# Patient Record
Sex: MALE | Race: WHITE | Employment: OTHER | URBAN - METROPOLITAN AREA
[De-identification: names, ages, dates, MRNs, and addresses within clinical notes are randomized per-mention and may not be internally consistent; named-entity substitution may affect disease eponyms.]

---

## 2018-02-28 PROBLEM — F31.9 BIPOLAR DEPRESSION (HCC): Status: ACTIVE | Noted: 2018-02-28

## 2018-02-28 PROBLEM — E78.00 HYPERCHOLESTEROLEMIA: Chronic | Status: ACTIVE | Noted: 2018-02-28

## 2020-03-02 ENCOUNTER — HOSPITAL ENCOUNTER (OUTPATIENT)
Dept: GENERAL RADIOLOGY | Age: 45
Discharge: HOME | End: 2020-03-02

## 2020-03-02 DIAGNOSIS — Z00.00 ROUTINE GENERAL MEDICAL EXAMINATION AT A HEALTH CARE FACILITY: ICD-10-CM

## 2021-03-08 ENCOUNTER — HOSPITAL ENCOUNTER (OUTPATIENT)
Dept: GENERAL RADIOLOGY | Age: 46
Discharge: HOME | End: 2021-03-08

## 2021-03-08 DIAGNOSIS — R73.01 FASTING HYPERGLYCEMIA: ICD-10-CM

## 2021-03-08 DIAGNOSIS — Z00.00 ROUTINE GENERAL MEDICAL EXAMINATION AT A HEALTH CARE FACILITY: ICD-10-CM

## 2021-03-08 DIAGNOSIS — E78.00 HYPERCHOLESTEROLEMIA: Chronic | ICD-10-CM

## 2021-03-15 PROBLEM — H52.10 MYOPIA: Status: ACTIVE | Noted: 2019-06-17

## 2021-03-15 PROBLEM — H25.019 CORTICAL SENILE CATARACT: Status: ACTIVE | Noted: 2019-06-17

## 2022-03-19 PROBLEM — E78.00 HYPERCHOLESTEROLEMIA: Status: ACTIVE | Noted: 2018-02-28

## 2022-03-19 PROBLEM — H25.019 CORTICAL SENILE CATARACT: Status: ACTIVE | Noted: 2019-06-17

## 2022-03-20 PROBLEM — H52.10 MYOPIA: Status: ACTIVE | Noted: 2019-06-17

## 2023-07-27 ENCOUNTER — OFFICE VISIT (OUTPATIENT)
Dept: INTERNAL MEDICINE CLINIC | Facility: CLINIC | Age: 48
End: 2023-07-27
Payer: COMMERCIAL

## 2023-07-27 VITALS
WEIGHT: 216.4 LBS | SYSTOLIC BLOOD PRESSURE: 100 MMHG | OXYGEN SATURATION: 97 % | HEART RATE: 59 BPM | BODY MASS INDEX: 28.68 KG/M2 | HEIGHT: 73 IN | DIASTOLIC BLOOD PRESSURE: 70 MMHG

## 2023-07-27 DIAGNOSIS — G89.29 CHRONIC BILATERAL LOW BACK PAIN WITHOUT SCIATICA: ICD-10-CM

## 2023-07-27 DIAGNOSIS — L23.7 POISON IVY DERMATITIS: Primary | ICD-10-CM

## 2023-07-27 DIAGNOSIS — M54.50 CHRONIC BILATERAL LOW BACK PAIN WITHOUT SCIATICA: ICD-10-CM

## 2023-07-27 DIAGNOSIS — B07.9 VIRAL WARTS, UNSPECIFIED TYPE: ICD-10-CM

## 2023-07-27 DIAGNOSIS — M51.36 DEGENERATIVE DISC DISEASE, LUMBAR: ICD-10-CM

## 2023-07-27 DIAGNOSIS — M51.27 LUMBOSACRAL DISC HERNIATION: ICD-10-CM

## 2023-07-27 PROCEDURE — 99214 OFFICE O/P EST MOD 30 MIN: CPT | Performed by: NURSE PRACTITIONER

## 2023-07-27 RX ORDER — GABAPENTIN 100 MG/1
100 CAPSULE ORAL NIGHTLY
COMMUNITY

## 2023-07-27 RX ORDER — PREDNISONE 20 MG/1
TABLET ORAL
Qty: 18 TABLET | Refills: 0 | Status: SHIPPED | OUTPATIENT
Start: 2023-07-27

## 2023-07-27 SDOH — ECONOMIC STABILITY: FOOD INSECURITY: WITHIN THE PAST 12 MONTHS, YOU WORRIED THAT YOUR FOOD WOULD RUN OUT BEFORE YOU GOT MONEY TO BUY MORE.: NEVER TRUE

## 2023-07-27 SDOH — ECONOMIC STABILITY: INCOME INSECURITY: HOW HARD IS IT FOR YOU TO PAY FOR THE VERY BASICS LIKE FOOD, HOUSING, MEDICAL CARE, AND HEATING?: NOT HARD AT ALL

## 2023-07-27 SDOH — ECONOMIC STABILITY: FOOD INSECURITY: WITHIN THE PAST 12 MONTHS, THE FOOD YOU BOUGHT JUST DIDN'T LAST AND YOU DIDN'T HAVE MONEY TO GET MORE.: NEVER TRUE

## 2023-07-27 SDOH — ECONOMIC STABILITY: HOUSING INSECURITY
IN THE LAST 12 MONTHS, WAS THERE A TIME WHEN YOU DID NOT HAVE A STEADY PLACE TO SLEEP OR SLEPT IN A SHELTER (INCLUDING NOW)?: NO

## 2023-07-27 ASSESSMENT — PATIENT HEALTH QUESTIONNAIRE - PHQ9
SUM OF ALL RESPONSES TO PHQ QUESTIONS 1-9: 0
SUM OF ALL RESPONSES TO PHQ9 QUESTIONS 1 & 2: 0
1. LITTLE INTEREST OR PLEASURE IN DOING THINGS: 0
2. FEELING DOWN, DEPRESSED OR HOPELESS: 0
SUM OF ALL RESPONSES TO PHQ QUESTIONS 1-9: 0

## 2023-07-27 NOTE — PROGRESS NOTES
refer to Dr. Efren Rodrigues for evaluation and treatment. Regarding his low back pain he would like to have referral however we discussed he will need repeat MRI. He agrees. Also discussed typically his back pain may feel better while he is using the prednisone. Avoid NSAIDs while on prednisone. DAVID [place] - GAGANDE[facility]    Dictated using voice recognition software.  Proofread, but unrecognized voice recognition errors may exist.

## 2023-08-06 PROBLEM — M51.36 DEGENERATIVE DISC DISEASE, LUMBAR: Status: ACTIVE | Noted: 2023-08-06

## 2023-08-06 ASSESSMENT — ENCOUNTER SYMPTOMS: BACK PAIN: 1

## 2023-08-14 ENCOUNTER — TELEPHONE (OUTPATIENT)
Dept: INTERNAL MEDICINE CLINIC | Facility: CLINIC | Age: 48
End: 2023-08-14

## 2023-08-14 DIAGNOSIS — B07.9 VIRAL WARTS, UNSPECIFIED TYPE: Primary | ICD-10-CM

## 2023-08-14 NOTE — TELEPHONE ENCOUNTER
----- Message from Jodene Cumber III sent at 8/11/2023 11:21 AM EDT -----  Regarding: Dr. Tom Wright referral problem  Contact: [phone]  Hi! Dr. Elvia Sanchez office DOES NOT do in house wart removal.  They only do 'treatment' and said that would be a prescription only. I declined the appointment. May I please request a referral to a dermatologist that definitely does do 'in house' removals?

## 2023-10-24 ENCOUNTER — HOSPITAL ENCOUNTER (OUTPATIENT)
Dept: MRI IMAGING | Age: 48
Discharge: HOME | End: 2023-10-27
Payer: COMMERCIAL

## 2023-10-24 DIAGNOSIS — M54.50 CHRONIC BILATERAL LOW BACK PAIN WITHOUT SCIATICA: ICD-10-CM

## 2023-10-24 DIAGNOSIS — M51.27 LUMBOSACRAL DISC HERNIATION: ICD-10-CM

## 2023-10-24 DIAGNOSIS — G89.29 CHRONIC BILATERAL LOW BACK PAIN WITHOUT SCIATICA: ICD-10-CM

## 2023-10-24 DIAGNOSIS — M51.36 DEGENERATIVE DISC DISEASE, LUMBAR: ICD-10-CM

## 2023-10-24 PROCEDURE — 72148 MRI LUMBAR SPINE W/O DYE: CPT

## 2023-10-25 ENCOUNTER — TELEPHONE (OUTPATIENT)
Dept: INTERNAL MEDICINE CLINIC | Facility: CLINIC | Age: 48
End: 2023-10-25

## 2023-10-25 ENCOUNTER — PATIENT MESSAGE (OUTPATIENT)
Dept: INTERNAL MEDICINE CLINIC | Facility: CLINIC | Age: 48
End: 2023-10-25

## 2023-10-25 DIAGNOSIS — G89.29 CHRONIC BILATERAL LOW BACK PAIN WITHOUT SCIATICA: Primary | ICD-10-CM

## 2023-10-25 DIAGNOSIS — M51.36 DEGENERATIVE DISC DISEASE, LUMBAR: ICD-10-CM

## 2023-10-25 DIAGNOSIS — M54.50 CHRONIC BILATERAL LOW BACK PAIN WITHOUT SCIATICA: Primary | ICD-10-CM

## 2023-10-25 NOTE — TELEPHONE ENCOUNTER
----- [facility] sent at 10/25/2023  3:31 PM EDT -----  Regarding: Lumbar MRI  Contact: [phone]  Hi there -   I do not know if you can review my results online, but I received my MRI results. I am told I can  my disc at [facility], and I will, but at this point, I would like to proceed to the next step and seek cortisone injection. The test results do not indicate surgical intervention, but I definitely require pain relief and I'm not going to use opioids. Thanks so much for getting back to me as soon as you are able.   - Filiberto Dumont

## 2023-10-25 NOTE — TELEPHONE ENCOUNTER
Patient is requesting the results of his MRI. Patient notified that Gloria Keita NP is not in the office today (10/25/23) or tomorrow (10/26/23). Also informed patient that the message would be relayed to [facility] but she may not get the message until she arrives back into the office. Patient verbalized understanding.

## 2023-10-31 ENCOUNTER — TELEPHONE (OUTPATIENT)
Dept: INTERNAL MEDICINE CLINIC | Facility: CLINIC | Age: 48
End: 2023-10-31

## 2023-10-31 NOTE — TELEPHONE ENCOUNTER
Called and notified patient of the MRI Lumbar Spine result note per Fredi Woodard NP. Patient stated that he doesn't mind being referred to the same orthopedic office but doesn't want to be seen by Dr. Kei Wesley.

## 2023-11-01 NOTE — TELEPHONE ENCOUNTER
Called and notified patient of the MRI Lumbar Spine result note per Cody Peng NP. Patient stated that he doesn't mind being referred to the same orthopedic office but doesn't want to be seen by Dr. Gerald Howell.

## 2024-01-12 NOTE — TELEPHONE ENCOUNTER
From: Casey Dumont III  To: Nikole Maurice  Sent: 10/25/2023 3:31 PM EDT  Subject: Lumbar MRI    Hi there -   I do not know if you can review my results online, but I received my MRI results. I am told I can  my disc at [facility], and I will, but at this point, I would like to proceed to the next step and seek cortisone injection. The test results do not indicate surgical intervention, but I definitely require pain relief and I'm not going to use opioids. Thanks so much for getting back to me as soon as you are able. - Filiberto Dumont

## 2024-02-23 NOTE — PROGRESS NOTES
imaging.    Location of greatest pain? Lumber Spine  Other areas of Pain? Right UE  Onset:1997   Inciting event: heavy lifting    Course: gradually  Pain Description:Dull or Aching, Throbbing, Tightness, and Stabbing or Sharp  Worst during: All the time  How often does the pain occur?  Constant  Pain score:  -Right now: 5  -At Its best: 3  -At its worst: 8    How does the pain make you feel? Depressed, Angry, Frustrated, and Helpless and/ or Hopeless    What does the pain interfere with?  Sleep, Daily Activities, and Work    What makes pain better?  Medications and Excercise    What makes pain worst?   Activity, lifting, sleeping, non movement    What other symptoms do you have?  N/a  Loss of bowel/bladder control? No    HPI          Objective:      PHYSICAL EXAM:  Constitutional: No acute distress.  Eyes: Pupils equal round and reactive to light, EOMI, no icterus. Mouth: Moist.  CV: No significant edema noted, no significant JVD, RRR Lungs: Speaks in full sentences. Nonlabored breathing, CTAB MSK: Moves all extremities well.  Direct reflection of the lower lumbar spinous processes reproduces low back pain.  Facet loading positive bilaterally but more midline than paraspinous.  Straight leg raise negative.  Right shoulder mild tender palpation over anterior surface.  There is no motor reflexive sensory deficits.  Neuro: Alert and oriented x3. Cranial nerves II through XII are grossly intact. Psych: Good insight and judgement, appropriate mood and affect    Physical Exam     Ortho Exam     There were no vitals filed for this visit.  Wt Readings from Last 3 Encounters:   07/27/23 98.2 kg (216 lb 6.4 oz)   03/28/22 95.5 kg (210 lb 9.6 oz)   09/21/21 97 kg (213 lb 12.8 oz)              Previous interventions:  Procedure Date Results   Ordered L4-5 via disc                                     Previous medication trials: Listed:  Class Medication Results   NSAIDs Ibuprofen Celebrex naproxen Limited benefit   Oral steroids

## 2024-02-29 ENCOUNTER — OFFICE VISIT (OUTPATIENT)
Age: 49
End: 2024-02-29
Payer: COMMERCIAL

## 2024-02-29 DIAGNOSIS — M51.36 DEGENERATIVE DISC DISEASE, LUMBAR: Primary | ICD-10-CM

## 2024-02-29 PROCEDURE — 99204 OFFICE O/P NEW MOD 45 MIN: CPT | Performed by: ANESTHESIOLOGY

## 2024-02-29 RX ORDER — MEMANTINE HYDROCHLORIDE 10 MG/1
TABLET ORAL
COMMUNITY
Start: 2024-02-26

## 2024-04-01 ENCOUNTER — TELEPHONE (OUTPATIENT)
Age: 49
End: 2024-04-01

## 2024-04-01 NOTE — TELEPHONE ENCOUNTER
Mr. Dumont would like to know the self pay price for CPT codes 067T & 068T for [email or web address] please contact him at [phone].

## 2024-06-13 ENCOUNTER — OFFICE VISIT (OUTPATIENT)
Age: 49
End: 2024-06-13
Payer: COMMERCIAL

## 2024-06-13 DIAGNOSIS — M47.817 LUMBOSACRAL SPONDYLOSIS WITHOUT MYELOPATHY: ICD-10-CM

## 2024-06-13 DIAGNOSIS — M54.51 VERTEBROGENIC LOW BACK PAIN: Primary | ICD-10-CM

## 2024-06-13 PROCEDURE — 99213 OFFICE O/P EST LOW 20 MIN: CPT | Performed by: ANESTHESIOLOGY

## 2024-06-13 NOTE — PROGRESS NOTES
others.  He underwent MRI which displays degenerative disc disease significant changes at L4-5 as well as L5-S1 with intracanal herniations.  He exercises routinely every week and continues to experience pain.  He is undergone a number of home directed physical exercise regimens that failed to provide benefit.  He is utilize TENS unit chiropractic care massage therapy acupuncture heat and ice all with varying degrees of limited benefit.  Has not undergone any injections and has not undergone any surgical treatment.  Would like to avoid surgical treatment at this time.  He has tried over-the-counter medication such as ibuprofen and Tylenol as well as other prescription strength NSAIDs oral steroids antiepileptics antidepressants creams gels patches and low-dose narcotics.  These have all failed to provide him significant sustained relief.  We therefore discussed proceeding with targeted interventional therapy.  He expressed understanding and agreement with the plan moving forward.  Time spent reviewing patient's chart, notes, imaging.    Location of greatest pain? Lumber Spine  Other areas of Pain? Right UE  Onset:1997   Inciting event: heavy lifting    Course: gradually  Pain Description:Dull or Aching, Throbbing, Tightness, and Stabbing or Sharp  Worst during: All the time  How often does the pain occur?  Constant  Pain score:  -Right now: 5  -At Its best: 3  -At its worst: 8    How does the pain make you feel? Depressed, Angry, Frustrated, and Helpless and/ or Hopeless    What does the pain interfere with?  Sleep, Daily Activities, and Work    What makes pain better?  Medications and Excercise    What makes pain worst?   Activity, lifting, sleeping, non movement    What other symptoms do you have?  N/a  Loss of bowel/bladder control? No    HPI          Objective:      PHYSICAL EXAM:  Constitutional: No acute distress.  Eyes: Pupils equal round and reactive to light, EOMI, no icterus. Mouth: Moist.  CV: No

## 2024-07-25 ENCOUNTER — OFFICE VISIT (OUTPATIENT)
Dept: ORTHOPEDIC SURGERY | Age: 49
End: 2024-07-25
Payer: COMMERCIAL

## 2024-07-25 DIAGNOSIS — M47.817 LUMBOSACRAL SPONDYLOSIS WITHOUT MYELOPATHY: Primary | ICD-10-CM

## 2024-07-25 PROCEDURE — 64493 INJ PARAVERT F JNT L/S 1 LEV: CPT | Performed by: ANESTHESIOLOGY

## 2024-07-25 PROCEDURE — 64494 INJ PARAVERT F JNT L/S 2 LEV: CPT | Performed by: ANESTHESIOLOGY

## 2024-07-25 NOTE — PROGRESS NOTES
aspect of this junction. Satisfactory needle position was confirmed on AP, lateral, and oblique visualizations. 0.5 ml of 0.5% bupivacaine was then injected.    Next, an oblique view of the right L5 transverse process was then utilized to identify the junction of the junction of the transverse process and superior articulating process. The skin overlying this area was anesthetized with  0.2 ml of 1% lidocaine using a 25 G 1.5 inch needle. Next, using intermittent fluoroscopic guidance, the spinal needle needle was advanced toward the junction of the transverse process and superior articulating process, favoring the cephalad aspect of this junction. Satisfactory needle position was confirmed on AP, lateral, and oblique visualizations. 0.5 ml of 0.5% bupivacaine was then injected.    Next, a posterior-anterior view of the right sacral ala was obtained. The skin overlying the sacral ala was anesthetized with 0.2 ml of 1% lidocaine via a 25G 1.5 inch needle. The spinal needle needle was then advanced using intermittent fluoroscopic guidance toward the cephalad aspect of the sacral ala. Satisfactory needle position was confirmed on AP, lateral, and oblique visualizations. 0.5 ml of 0.5% bupivacaine were then injected.    Next, a posterior-anterior view of the left sacral ala was obtained. The skin overlying the sacral ala was anesthetized with 0.2 ml of 1% lidocaine via a 25G 1.5 inch needle. The spinal needle needle was then advanced using intermittent fluoroscopic guidance toward the cephalad aspect of the sacral ala. Satisfactory needle position was confirmed on AP, lateral, and oblique visualizations. 0.5 ml of 0.5% bupivacaine were then injected.    An oblique view of the left L5 transverse process was then utilized to identify the junction of the junction of the transverse process and superior articulating process. The skin overlying this area was anesthetized with 0.2 ml of 1% lidocaine using a 25 G 1.5 inch